# Patient Record
Sex: FEMALE | Race: BLACK OR AFRICAN AMERICAN | NOT HISPANIC OR LATINO | Employment: FULL TIME | URBAN - METROPOLITAN AREA
[De-identification: names, ages, dates, MRNs, and addresses within clinical notes are randomized per-mention and may not be internally consistent; named-entity substitution may affect disease eponyms.]

---

## 2021-05-14 ENCOUNTER — APPOINTMENT (OUTPATIENT)
Dept: GENERAL RADIOLOGY | Facility: CLINIC | Age: 31
End: 2021-05-14
Attending: EMERGENCY MEDICINE
Payer: COMMERCIAL

## 2021-05-14 ENCOUNTER — HOSPITAL ENCOUNTER (EMERGENCY)
Facility: CLINIC | Age: 31
Discharge: HOME | End: 2021-05-14
Attending: EMERGENCY MEDICINE | Admitting: EMERGENCY MEDICINE
Payer: COMMERCIAL

## 2021-05-14 VITALS
TEMPERATURE: 97.4 F | RESPIRATION RATE: 16 BRPM | OXYGEN SATURATION: 100 % | SYSTOLIC BLOOD PRESSURE: 124 MMHG | DIASTOLIC BLOOD PRESSURE: 82 MMHG | HEIGHT: 63 IN | WEIGHT: 145 LBS | BODY MASS INDEX: 25.69 KG/M2 | HEART RATE: 79 BPM

## 2021-05-14 DIAGNOSIS — R07.89 CHEST WALL PAIN: ICD-10-CM

## 2021-05-14 LAB
ANION GAP SERPL CALCULATED.3IONS-SCNC: 7 MMOL/L (ref 3–14)
BASOPHILS # BLD AUTO: 0 10E9/L (ref 0–0.2)
BASOPHILS NFR BLD AUTO: 0 %
BUN SERPL-MCNC: 8 MG/DL (ref 7–30)
CALCIUM SERPL-MCNC: 9.1 MG/DL (ref 8.5–10.1)
CHLORIDE SERPL-SCNC: 106 MMOL/L (ref 94–109)
CO2 SERPL-SCNC: 24 MMOL/L (ref 20–32)
CREAT SERPL-MCNC: 0.6 MG/DL (ref 0.52–1.04)
DIFFERENTIAL METHOD BLD: ABNORMAL
EOSINOPHIL # BLD AUTO: 0 10E9/L (ref 0–0.7)
EOSINOPHIL NFR BLD AUTO: 0 %
ERYTHROCYTE [DISTWIDTH] IN BLOOD BY AUTOMATED COUNT: 13.2 % (ref 10–15)
GFR SERPL CREATININE-BSD FRML MDRD: >90 ML/MIN/{1.73_M2}
GLUCOSE SERPL-MCNC: 78 MG/DL (ref 70–99)
HCT VFR BLD AUTO: 43.7 % (ref 35–47)
HGB BLD-MCNC: 14.3 G/DL (ref 11.7–15.7)
LYMPHOCYTES # BLD AUTO: 3 10E9/L (ref 0.8–5.3)
LYMPHOCYTES NFR BLD AUTO: 26 %
MAGNESIUM SERPL-MCNC: 2.1 MG/DL (ref 1.6–2.3)
MCH RBC QN AUTO: 31.2 PG (ref 26.5–33)
MCHC RBC AUTO-ENTMCNC: 32.7 G/DL (ref 31.5–36.5)
MCV RBC AUTO: 95 FL (ref 78–100)
MONOCYTES # BLD AUTO: 0.9 10E9/L (ref 0–1.3)
MONOCYTES NFR BLD AUTO: 8 %
NEUTROPHILS # BLD AUTO: 7.7 10E9/L (ref 1.6–8.3)
NEUTROPHILS NFR BLD AUTO: 66 %
PLATELET # BLD AUTO: 185 10E9/L (ref 150–450)
PLATELET # BLD EST: ABNORMAL 10*3/UL
POTASSIUM SERPL-SCNC: 3.5 MMOL/L (ref 3.4–5.3)
RBC # BLD AUTO: 4.59 10E12/L (ref 3.8–5.2)
RBC MORPH BLD: ABNORMAL
SODIUM SERPL-SCNC: 137 MMOL/L (ref 133–144)
WBC # BLD AUTO: 11.7 10E9/L (ref 4–11)

## 2021-05-14 PROCEDURE — 80048 BASIC METABOLIC PNL TOTAL CA: CPT | Performed by: EMERGENCY MEDICINE

## 2021-05-14 PROCEDURE — 83735 ASSAY OF MAGNESIUM: CPT | Performed by: EMERGENCY MEDICINE

## 2021-05-14 PROCEDURE — 71046 X-RAY EXAM CHEST 2 VIEWS: CPT

## 2021-05-14 PROCEDURE — 258N000003 HC RX IP 258 OP 636: Performed by: EMERGENCY MEDICINE

## 2021-05-14 PROCEDURE — 99284 EMERGENCY DEPT VISIT MOD MDM: CPT | Mod: 25

## 2021-05-14 PROCEDURE — 96374 THER/PROPH/DIAG INJ IV PUSH: CPT

## 2021-05-14 PROCEDURE — 85025 COMPLETE CBC W/AUTO DIFF WBC: CPT | Performed by: EMERGENCY MEDICINE

## 2021-05-14 PROCEDURE — 96361 HYDRATE IV INFUSION ADD-ON: CPT

## 2021-05-14 PROCEDURE — 250N000011 HC RX IP 250 OP 636: Performed by: EMERGENCY MEDICINE

## 2021-05-14 RX ORDER — IBUPROFEN 600 MG/1
600 TABLET, FILM COATED ORAL EVERY 6 HOURS
Qty: 28 TABLET | Refills: 0 | Status: SHIPPED | OUTPATIENT
Start: 2021-05-14 | End: 2021-05-21

## 2021-05-14 RX ORDER — SODIUM CHLORIDE 9 MG/ML
INJECTION, SOLUTION INTRAVENOUS CONTINUOUS
Status: DISCONTINUED | OUTPATIENT
Start: 2021-05-14 | End: 2021-05-14 | Stop reason: HOSPADM

## 2021-05-14 RX ORDER — PANTOPRAZOLE SODIUM 40 MG/1
40 TABLET, DELAYED RELEASE ORAL DAILY
Qty: 14 TABLET | Refills: 0 | Status: SHIPPED | OUTPATIENT
Start: 2021-05-14 | End: 2021-05-28

## 2021-05-14 RX ORDER — METHOCARBAMOL 750 MG/1
750-1500 TABLET, FILM COATED ORAL 3 TIMES DAILY PRN
Qty: 30 TABLET | Refills: 0 | Status: SHIPPED | OUTPATIENT
Start: 2021-05-14 | End: 2021-05-19

## 2021-05-14 RX ORDER — KETOROLAC TROMETHAMINE 15 MG/ML
15 INJECTION, SOLUTION INTRAMUSCULAR; INTRAVENOUS ONCE
Status: COMPLETED | OUTPATIENT
Start: 2021-05-14 | End: 2021-05-14

## 2021-05-14 RX ADMIN — SODIUM CHLORIDE 1000 ML: 9 INJECTION, SOLUTION INTRAVENOUS at 14:45

## 2021-05-14 RX ADMIN — KETOROLAC TROMETHAMINE 15 MG: 15 INJECTION, SOLUTION INTRAMUSCULAR; INTRAVENOUS at 14:45

## 2021-05-14 ASSESSMENT — ENCOUNTER SYMPTOMS
NUMBNESS: 1
DYSURIA: 0
MYALGIAS: 1
NAUSEA: 0
VOMITING: 0
DIFFICULTY URINATING: 0
FREQUENCY: 0
HEADACHES: 0

## 2021-05-14 ASSESSMENT — MIFFLIN-ST. JEOR: SCORE: 1341.85

## 2021-05-14 NOTE — LETTER
May 14, 2021      To Whom It May Concern:      Kristy Giang was seen in our Emergency Department today, 05/14/21.  I expect her condition to improve over the next 3 days.  She may return to work when improved.    Sincerely,        Lukasz Peguero MD

## 2021-05-14 NOTE — ED TRIAGE NOTES
Patient states she woke up yesterday with severe left sided body aches from shoulder down to left hip.  States she can't move that side of body without severe pain.      Denies sleeping wrong on that side.  Denies lifting injury.  Has not tried OTC meds.  Thinks it might be related to fasting.      ABCs intact.  Alert and oriented x 4.

## 2021-05-14 NOTE — DISCHARGE INSTRUCTIONS
1. -Take acetaminophen 500 to 1000 mg by mouth every 4 to 6 hours as needed for pain or fever.  Do not take more than 4000 mg in 24 hours.  Do not take within 6 hours of another acetaminophen containing medication such as norco (vicodin) or percocet.  - Take ibuprofen 600 to 800 mg by mouth every 6 to 8 hours as needed for pain or fever  2.  Please follow-up with your primary physician next week for recheck as scheduled.  3.  Please return to the emergency department as needed for new or worsening symptoms including severe and uncontrollable pain, worsening focal weakness, severe shortness of breath, fainting, any other concerning symptoms.

## 2021-05-14 NOTE — ED PROVIDER NOTES
"  History   Chief Complaint:  Generalized Body Aches     HPI   rKisty Giang is a right-handed 30 year old female who presents with generalized body aches. The patient reports that she \"cant feel\" the left side of her body and has been experiencing some chest pain. Both of these symptoms started about three hours ago. She endorses pain with inhalation and says that she is experiencing some numbness and soreness on the left side of her body. These symptoms are not present on the right. Chlorophyll and caffeine were used to treat symptoms at home, with little resolve. It was challenging for the patient to stand while making breakfast. Chest pain is rated at an 8/10 on the pain scale. A similar episode has never been experienced. She has recently been fasting for Ramadan. The patient denies changes in bowel movements and urination, falls, weakness, headache, history of surgeries, rash, nausea, or vomiting.     Of note, the patient has not been vaccinated for COVID and has never tested positive for the virus.     Review of Systems   Cardiovascular: Positive for chest pain.   Gastrointestinal: Negative for nausea and vomiting.   Genitourinary: Negative for decreased urine volume, difficulty urinating, dysuria, frequency and urgency.   Musculoskeletal: Positive for myalgias.   Skin: Negative for rash.   Neurological: Positive for numbness. Negative for headaches.   All other systems reviewed and are negative.      Allergies:  Hydrocodone    Medications:  Proair  Claritin    Past Medical History:    Allergic state    Past Surgical History:    No known surgical history    Family History:    No known family history    Social History:  Patient drove to the ED  Unaccompanied in ED  Denies tobacco, alcohol, or illicit drug use    Physical Exam     Patient Vitals for the past 24 hrs:   BP Temp Temp src Pulse Resp SpO2 Height Weight   05/14/21 1401 124/82 97.4  F (36.3  C) Temporal 79 14 100 % 1.6 m (5' 3\") 65.8 kg (145 lb) "       Physical Exam  Constitutional: Well developed, nontox appearance  Head: Atraumatic.   Neck:  no stridor, full ROM, no meningismus, no overt C-spine tenderness, negative Spurling's test  Eyes: no scleral icterus  Cardiovascular: RRR, 2+ bilat radial pulses  Pulmonary/Chest: nml resp effort, Clear BS bilat, reproducible left-sided chest pain  Abdominal: ND, soft, NT, no rebound or guarding   Ext: Warm, well perfused, no edema  Neurological: A&O, symmetric facies, moves ext x4, 5/5 strength throughout BU/LE, sensation grossly intact throughout  Skin: Skin is warm and dry.   Psychiatric: Behavior is normal. Thought content normal.   Nursing note and vitals reviewed.    Emergency Department Course     Imaging:    XR Chest 2 Views  No acute cardiopulmonary disease.    Read per radiology    Laboratory:     CBC: WBC 11.7 (H), HGB 14.3,    BMP: AWNL (Creatinine 0.60)     Magnesium: 2.1    Emergency Department Course:    Reviewed:  I reviewed nursing notes, vitals, past medical history and care everywhere    Assessments:  1426 I obtained history and examined the patient as noted above.   1625 I rechecked the patient and explained findings.     Interventions:  1445 Normal Saline 1000 mL IV  1445 Toradol 15 mg IV    Disposition:  The patient was discharged to home.     Impression & Plan     Medical Decision Makin year old female presenting w/ left-sided body pain, reproducible left chest pain    DDx includes chest wall pain, musculoskeletal pain, muscle strain, muscle spasm, electrolyte abnormality.  Doubt spinal cord pathology, CNS mass or CVA, meningitis, encephalitis, rhabdomyolysis given history and physical exam.  Labs significant for mild leukocytosis otherwise unremarkable.  Imaging sig for no acute cardiopulmonary disease.  Interventions as noted above with mild improvement in symptoms.  Given reproducibility on exam, doubt ACS, PE, pneumothorax or pleurisy.  Likely musculoskeletal in nature.   Prescriptions written as noted below for symptomatic relief with plan for outpatient follow-up with PCP as previously scheduled in 5 days.  At this time I feel the pt is safe for discharge.  Recommendations given regarding follow up with PCP and return to the emergency department as needed for new or worsening symptoms.  Pt counseled on all results, disposition and diagnosis.  They are understanding and agreeable to plan. Patient discharged in stable condition.      Diagnosis:    ICD-10-CM    1. Chest wall pain  R07.89        Discharge Medications:  New Prescriptions    IBUPROFEN (ADVIL/MOTRIN) 600 MG TABLET    Take 1 tablet (600 mg) by mouth every 6 hours for 7 days    METHOCARBAMOL (ROBAXIN) 750 MG TABLET    Take 1-2 tablets (750-1,500 mg) by mouth 3 times daily as needed for muscle spasms    PANTOPRAZOLE (PROTONIX) 40 MG EC TABLET    Take 1 tablet (40 mg) by mouth daily for 14 days       Scribe Disclosure:  Bartolome SANTOYO, am serving as a scribe at 2:29 PM on 5/14/2021 to document services personally performed by Lukasz Peguero MD based on my observations and the provider's statements to me.            Lukasz Peguero MD  05/14/21 1778

## 2021-05-16 ENCOUNTER — NURSE TRIAGE (OUTPATIENT)
Dept: NURSING | Facility: CLINIC | Age: 31
End: 2021-05-16

## 2021-05-16 PROCEDURE — 93005 ELECTROCARDIOGRAM TRACING: CPT

## 2021-05-16 PROCEDURE — 99284 EMERGENCY DEPT VISIT MOD MDM: CPT

## 2021-05-17 ENCOUNTER — HOSPITAL ENCOUNTER (EMERGENCY)
Facility: CLINIC | Age: 31
Discharge: HOME | End: 2021-05-17
Attending: EMERGENCY MEDICINE | Admitting: EMERGENCY MEDICINE
Payer: COMMERCIAL

## 2021-05-17 VITALS
RESPIRATION RATE: 16 BRPM | SYSTOLIC BLOOD PRESSURE: 110 MMHG | TEMPERATURE: 98 F | OXYGEN SATURATION: 99 % | DIASTOLIC BLOOD PRESSURE: 68 MMHG | HEART RATE: 62 BPM

## 2021-05-17 DIAGNOSIS — M79.10 MYALGIA: ICD-10-CM

## 2021-05-17 DIAGNOSIS — R07.89 NON-CARDIAC CHEST PAIN: ICD-10-CM

## 2021-05-17 LAB
D DIMER PPP FEU-MCNC: <0.3 UG/ML FEU (ref 0–0.5)
HCG SERPL QL: NEGATIVE
INTERPRETATION ECG - MUSE: NORMAL
TROPONIN I SERPL-MCNC: <0.015 UG/L (ref 0–0.04)

## 2021-05-17 PROCEDURE — 84484 ASSAY OF TROPONIN QUANT: CPT | Performed by: EMERGENCY MEDICINE

## 2021-05-17 PROCEDURE — 85379 FIBRIN DEGRADATION QUANT: CPT | Performed by: EMERGENCY MEDICINE

## 2021-05-17 PROCEDURE — 84703 CHORIONIC GONADOTROPIN ASSAY: CPT | Performed by: EMERGENCY MEDICINE

## 2021-05-17 ASSESSMENT — ENCOUNTER SYMPTOMS
COUGH: 0
FEVER: 0
ABDOMINAL PAIN: 0
SHORTNESS OF BREATH: 1

## 2021-05-17 NOTE — TELEPHONE ENCOUNTER
Data:   Pt calling to report they developed chest pain on 5/14/21 and was evaluated in the ED which determined it to be musculoskeletal in nature and discharged with muscle relaxer, ibuprofen and Protonix. Pt calls tonight to indicate worsening chest pain along with some shortness of breath with activity. Pt rates pain 8/10 and needed to remove their bra in order to relieve the pain. Pt also indicates has had a very stressful day which may be contributing. Pt indicates is afraid of getting addicted to muscle relaxer so has only taken one today this morning around 11 which did provide relief.        Action:   Per protocol pt to be seen at the ED now for severe pain. Pt will go to [facility] ER and has someone to drive them. Care advice given.      Response:   Pt will go to ED now. Pt verbalizes understanding of care advice given.        Keaton Bradford RN 5/16/2021 8:55 PM  [facility] Nurse Advisor       Reason for Disposition    SEVERE chest pain    Additional Information    Negative: Severe difficulty breathing (e.g., struggling for each breath, speaks in single words)    Negative: Difficult to awaken or acting confused (e.g., disoriented, slurred speech)    Negative: Shock suspected (e.g., cold/pale/clammy skin, too weak to stand, low BP, rapid pulse)    Negative: [1] Chest pain lasts > 5 minutes AND [2] history of heart disease  (i.e., heart attack, bypass surgery, angina, angioplasty, CHF; not just a heart murmur)    Negative: [1] Chest pain lasts > 5 minutes AND [2] described as crushing, pressure-like, or heavy    Negative: [1] Chest pain lasts > 5 minutes AND [2] age > 50    Negative: [1] Chest pain lasts > 5 minutes AND [2] age > 30 AND [3] at least one cardiac risk factor (i.e., hypertension, diabetes, obesity, smoker or strong family history of heart disease)    Negative: [1] Chest pain lasts > 5 minutes AND [2] not relieved with nitroglycerin    Negative: Passed out (i.e., lost consciousness, collapsed  and was not responding)    Negative: Heart beating < 50 beats per minute OR > 140 beats per minute    Negative: Visible sweat on face or sweat dripping down face    Negative: Sounds like a life-threatening emergency to the triager    Negative: Followed a chest injury    Protocols used: CHEST PAIN-A-AH    COVID 19 Nurse Triage Plan/Patient Instructions    Please be aware that novel coronavirus (COVID-19) may be circulating in the community. If you develop symptoms such as fever, cough, or SOB or if you have concerns about the presence of another infection including coronavirus (COVID-19), please contact your health care provider or visit [email or web address].     Disposition/Instructions    ED Visit recommended. Follow protocol based instructions.     Bring Your Own Device:  Please also bring your smart device(s) (smart phones, tablets, laptops) and their charging cables for your personal use and to communicate with your care team during your visit.    Thank you for taking steps to prevent the spread of this virus.  o Limit your contact with others.  o Wear a simple mask to cover your cough.  o Wash your hands well and often.    Resources    [facility]: About COVID-19: [email or web address]    CDC: What to Do If You're Sick: www.cdc.gov/coronavirus/2019-ncov/about/steps-when-sick.html    CDC: Ending Home Isolation: www.cdc.gov/coronavirus/2019-ncov/hcp/disposition-in-home-patients.html     CDC: Caring for Someone: www.cdc.gov/coronavirus/2019-ncov/if-you-are-sick/care-for-someone.html     [facility]: Interim Guidance for Hospital Discharge to Home: www.health.[facility].[place]/diseases/coronavirus/hcp/hospdischarge.pdf    [facility] clinical trials (COVID-19 research studies): [email or web address]/um-clinical-trials     Below are the COVID-19 hotlines at the [place] [facility] ([facility]). Interpreters are available.   o For health questions: Call 384-173-9721 or 1-294.165.1590 (7  a.m. to 7 p.m.)  o For questions about schools and childcare: Call 766-548-5010 or 1-877.938.8444 (7 a.m. to 7 p.m.)

## 2021-05-17 NOTE — ED TRIAGE NOTES
Patient reports being seen on 5/14 and dx costochondritis , prescribed muscle relaxer and reports minimal relief.    Last dose this AM

## 2021-05-17 NOTE — ED PROVIDER NOTES
History     Chief Complaint:  Chest Pain    HPI  Kristy Giang is a 30 year old female who presents to the emergency department for evaluation of chest pain. Patient was seen here in the ED on 5/14/21 for chest wall pain, workup at that time described below, and she was sent home with Robaxin and Protonix. Patient has been taking this but states this has not been helping. Her pain was initially only on the left side but is now across her chest, causing her to feel short of breath. Pain also worsens with breathing and movement. No fevers, cough, rash, or abdominal pain.    5/14 Workup:    XR Chest 2 Views  No acute cardiopulmonary disease.     CBC: WBC 11.7 (H), HGB 14.3,    BMP: AWNL (Creatinine 0.60)   Magnesium: 2.1    Review of Systems   Constitutional: Negative for fever.   Respiratory: Positive for shortness of breath. Negative for cough.    Cardiovascular: Positive for chest pain.   Gastrointestinal: Negative for abdominal pain.   Skin: Negative for rash.   All other systems reviewed and are negative.    Allergies:  Hydrocodone    Medications:    Albuterol  Loratadine  Robaxin  Protonix    Past Medical History:    No past medical history.    Social History:  The patient presents to the emergency department alone.    Physical Exam     Patient Vitals for the past 24 hrs:   BP Temp Pulse Resp SpO2   05/17/21 0128 110/68 -- 62 16 99 %   05/17/21 0115 110/68 -- 62 10 100 %   05/17/21 0100 108/71 -- 77 10 100 %   05/17/21 0045 114/74 -- 74 19 100 %   05/17/21 0030 122/83 -- 63 -- 99 %   05/16/21 2220 124/79 98  F (36.7  C) 68 18 100 %     Physical Exam  Nursing note and vitals reviewed.  Constitutional: Cooperative. Sitting up cross-legged on the bed. Appears comfortable.  HENT:   Mouth/Throat: Mucous membranes are normal.   Cardiovascular: Normal rate, regular rhythm and normal heart sounds.  No murmur.  Pulmonary/Chest: Effort normal and breath sounds normal. No respiratory distress. No wheezes. No  rales.   Abdominal: Soft. Normal appearance and bowel sounds are normal. No distension. There is no tenderness.   Musculoskeletal: No LE edema  Neurological: Alert. Oriented x4  Skin: Skin is warm and dry. No rash noted.   Psychiatric: Normal mood and affect.      Emergency Department Course   ECG  ECG taken at 0040, ECG read at 0047  Normal sinus rhythm with sinus arrhythmia. Normal ECG.  Rate 69 bpm. MT interval 158 ms. QRS duration 72 ms. QT/QTc 392/420 ms. P-R-T axes 80 67 41.     Laboratory:  Troponin (Collected 0036): <0.015    D-dimer: <0.3    HCG Qualitative Blood: Negative    Reviewed:  I reviewed the patient's nursing notes, vitals, past medical records, Care Everywhere.     Assessments:  0017 I assessed the patient. Exam findings described above.    0039 Patient declined COVID swab as she had a negative test earlier this week for her job.     115 I reassessed and updated the patient.     Disposition:  Discharged to home.    Impression & Plan    Medical Decision Making:  Kristy Giang is a 30 year old female who presents with chest pain.  The work up in the Emergency Department is negative.  I considered a broad differential diagnosis in this patient including life-threatening etiologies such as acute coronary syndrome, myocardial infarction, pulmonary embolism, acute aortic dissection, myocarditis, pericarditis, acute valvular insufficiency amongst others.  Other causes considered for this patient included pneumonia, pneumothorax, chest wall source, pericarditis, pleurisy, esophageal spasm, etc.  No serious etiology for the chest pain were detected today during this visit.  Close follow up with primary care is indicated should the pain continue, as further work up may be performed; this was made clear to the patient, who understands.     Diagnosis:    ICD-10-CM    1. Non-cardiac chest pain  R07.89    2. Myalgias  M79.10      Angelo Carrillo  5/17/2021   EMERGENCY DEPARTMENT  Scribe Disclosure:  Angelo SANTOYO  Gerardo, am serving as a scribe at 12:17 AM on 5/17/2021 to document services personally performed by Antonio Zuniga MD based on my observations and the provider's statements to me.          Antonio Zuniga MD  05/17/21 0207

## 2021-08-13 ENCOUNTER — APPOINTMENT (OUTPATIENT)
Dept: GENERAL RADIOLOGY | Facility: CLINIC | Age: 31
End: 2021-08-13
Attending: PHYSICIAN ASSISTANT
Payer: COMMERCIAL

## 2021-08-13 ENCOUNTER — HOSPITAL ENCOUNTER (EMERGENCY)
Facility: CLINIC | Age: 31
Discharge: HOME | End: 2021-08-13
Attending: PHYSICIAN ASSISTANT | Admitting: PHYSICIAN ASSISTANT
Payer: COMMERCIAL

## 2021-08-13 VITALS
HEART RATE: 83 BPM | SYSTOLIC BLOOD PRESSURE: 115 MMHG | TEMPERATURE: 98 F | RESPIRATION RATE: 16 BRPM | DIASTOLIC BLOOD PRESSURE: 81 MMHG | OXYGEN SATURATION: 98 %

## 2021-08-13 DIAGNOSIS — T50.Z95A ADVERSE EFFECT OF VACCINE, INITIAL ENCOUNTER: ICD-10-CM

## 2021-08-13 DIAGNOSIS — M79.10 MYALGIA: ICD-10-CM

## 2021-08-13 LAB
ATRIAL RATE - MUSE: 61 BPM
DIASTOLIC BLOOD PRESSURE - MUSE: NORMAL MMHG
INTERPRETATION ECG - MUSE: NORMAL
P AXIS - MUSE: 67 DEGREES
PR INTERVAL - MUSE: 154 MS
QRS DURATION - MUSE: 72 MS
QT - MUSE: 386 MS
QTC - MUSE: 388 MS
R AXIS - MUSE: 61 DEGREES
SYSTOLIC BLOOD PRESSURE - MUSE: NORMAL MMHG
T AXIS - MUSE: 32 DEGREES
VENTRICULAR RATE- MUSE: 61 BPM

## 2021-08-13 PROCEDURE — 71046 X-RAY EXAM CHEST 2 VIEWS: CPT

## 2021-08-13 PROCEDURE — 93005 ELECTROCARDIOGRAM TRACING: CPT

## 2021-08-13 PROCEDURE — 99285 EMERGENCY DEPT VISIT HI MDM: CPT

## 2021-08-13 RX ORDER — LORAZEPAM 2 MG/ML
INJECTION INTRAMUSCULAR
Status: DISCONTINUED
Start: 2021-08-13 | End: 2021-08-13 | Stop reason: HOSPADM

## 2021-08-13 ASSESSMENT — ENCOUNTER SYMPTOMS
DYSURIA: 0
COLOR CHANGE: 0
DIAPHORESIS: 0
NAUSEA: 0
SHORTNESS OF BREATH: 1
FEVER: 0
DIARRHEA: 0
MYALGIAS: 1
SORE THROAT: 0

## 2021-08-13 NOTE — ED TRIAGE NOTES
Patient presents with complaints of left arm pain. Got COVID vaccine 8/2 in left arm. Experienced typical arm pain for a couple days, but the pain started radiating underneath armpit this past week. Also experiencing some shortness of breath. No apparent respiratory distress in triage.

## 2021-08-13 NOTE — ED PROVIDER NOTES
History   Chief Complaint:  Arm Pain       HPI   Kristy Giang is a right handed 30 year old female who presents with left underarm pain and shortness of breath after getting her Covid-19 vaccine on August 2nd. The patient had soreness on the injection site for 5 days and the pain radiated to her underarm and her left ribs. She then developed shortness of breath. Yesterday the pain and shortness of breath got worse and she was not able tolerate the pain. She is not able to do normal activity with her left arm and cannot sleep on her left side. She denies diaphoresis, rash, fever, congestion, sore throat, nausea, diarrhea, dysuria, or discoloration. The patient does not take birth control and there is no chance of being pregnant. She normally coughs but there has been no changes.     Review of Systems   Constitutional: Negative for diaphoresis and fever.   HENT: Negative for congestion and sore throat.    Respiratory: Positive for shortness of breath.    Gastrointestinal: Negative for diarrhea and nausea.   Genitourinary: Negative for dysuria.   Musculoskeletal: Positive for myalgias.   Skin: Negative for color change and rash.     Allergies:  Hydrocodone    Medications:  The patient is currently on no regular medications.    Past Medical History:    The patient denies past medical history.     Family History:    Thyroid disease  Diabetes    Social History:  The patient presents alone.     Physical Exam     Patient Vitals for the past 24 hrs:   BP Temp Temp src Pulse Resp SpO2   08/13/21 1119 115/81 98  F (36.7  C) Oral 83 16 98 %   08/13/21 1117 -- 98  F (36.7  C) Oral 79 16 98 %       Physical Exam  Vitals and nursing note reviewed.   Constitutional:       General: She is not in acute distress.     Appearance: She is not diaphoretic.   HENT:      Head: Normocephalic and atraumatic.   Eyes:      General: No scleral icterus.  Cardiovascular:      Rate and Rhythm: Normal rate and regular rhythm.      Pulses:  Normal pulses.      Heart sounds: Normal heart sounds.   Pulmonary:      Effort: Pulmonary effort is normal. No respiratory distress.      Breath sounds: Normal breath sounds.   Chest:      Chest wall: Tenderness present.   Abdominal:      General: There is no distension.      Palpations: Abdomen is soft.      Tenderness: There is no abdominal tenderness. There is no guarding.   Musculoskeletal:         General: No tenderness.      Comments: No erythema, petechiae, obvious swelling, deformity, contusoin, pallor. There is tenderness of the L deltoid and pain along the L flank. No rash noted.    Skin:     General: Skin is warm.      Findings: No rash.   Neurological:      Mental Status: She is alert.       Emergency Department Course   ECG  ECG taken at 1148, ECG read at 1152  Normal sinus rhythm with sinus arrhythmia   No significant changes as compared to prior, dated 07/17/2021.  Rate 61 bpm. MA interval 154 ms. QRS duration 72 ms. QT/QTc 386/388 ms. P-R-T axes 67 61 32.     Imaging:  Chest XR, PA and LAT:  Since May 14, 2021, heart size is normal. No pleural   effusion, pneumothorax, or abnormal area of consolidation.    Reading per radiology.    Emergency Department Course:    Reviewed:  I reviewed nursing notes, vitals, past medical history and care everywhere    Assessments:  1127 I obtained history and examined the patient as noted above.     1233 I rechecked the patient and explained findings.     Disposition:  The patient was discharged to home.       Impression & Plan     Medical Decision Making:  No findings of cellulitis, septic arthritis, or abscess formation. No neurovascular deficits ruling down thoracic outlet syndrome, arterial occlusion. Clinically unlikely to be DVT formation. There is tenderness to the injection site and likely local immune reaction. No findings of tension pneumothorax on examination. EKG without changes of ischemia, dysrhythmia. Low suspicion for ACS/CAD at this time. Azeem harper  risk, PERC negative and pulmonary embolism appears unlikely. CXR without pneumothorax, pulmonary edema, pneumonia, rib fracture, free air in abdomen. Denies symptoms of UTI, kidney stone. She voices pregnancy is impossible ruling down ectopic pregnancy or pregnancy. Low suspicion for substantial rhabdomyolysis.      Diagnosis:    ICD-10-CM    1. Adverse effect of vaccine, initial encounter  T50.Z95A    2. Myalgia  M79.10        Discharge Medications:  New Prescriptions    DICLOFENAC (VOLTAREN) 1 % TOPICAL GEL    Apply 2 g topically 4 times daily for 7 days       Scribe Disclosure:  Tariq SANTOYO, am serving as a scribe at 11:31 AM on 8/13/2021 to document services personally performed by Fuentes Mancera based on my observations and the provider's statements to me.            Fuentes Mancera PA-C  08/13/21 [id number]

## 2024-02-08 ENCOUNTER — HOSPITAL ENCOUNTER (EMERGENCY)
Facility: CLINIC | Age: 34
Discharge: HOME | End: 2024-02-09
Attending: EMERGENCY MEDICINE | Admitting: EMERGENCY MEDICINE

## 2024-02-08 DIAGNOSIS — R79.89 ELEVATED D-DIMER: ICD-10-CM

## 2024-02-08 DIAGNOSIS — R07.89 OTHER CHEST PAIN: ICD-10-CM

## 2024-02-08 PROCEDURE — 96361 HYDRATE IV INFUSION ADD-ON: CPT

## 2024-02-08 PROCEDURE — 258N000003 HC RX IP 258 OP 636: Performed by: EMERGENCY MEDICINE

## 2024-02-08 PROCEDURE — 99285 EMERGENCY DEPT VISIT HI MDM: CPT | Mod: 25

## 2024-02-08 RX ORDER — LIDOCAINE 40 MG/G
CREAM TOPICAL
Status: DISCONTINUED | OUTPATIENT
Start: 2024-02-08 | End: 2024-02-09 | Stop reason: HOSPADM

## 2024-02-08 RX ADMIN — SODIUM CHLORIDE 1000 ML: 9 INJECTION, SOLUTION INTRAVENOUS at 23:33

## 2024-02-08 ASSESSMENT — ACTIVITIES OF DAILY LIVING (ADL): ADLS_ACUITY_SCORE: 35

## 2024-02-08 NOTE — Clinical Note
Kristy Giang was seen and treated in our emergency department on 2/8/2024.  She may return to work on 02/12/2024.       If you have any questions or concerns, please don't hesitate to call.      Lukasz Gustafson MD

## 2024-02-09 ENCOUNTER — APPOINTMENT (OUTPATIENT)
Dept: CT IMAGING | Facility: CLINIC | Age: 34
End: 2024-02-09
Attending: EMERGENCY MEDICINE

## 2024-02-09 VITALS
HEIGHT: 63 IN | HEART RATE: 86 BPM | BODY MASS INDEX: 26.95 KG/M2 | SYSTOLIC BLOOD PRESSURE: 93 MMHG | WEIGHT: 152.12 LBS | TEMPERATURE: 98.9 F | RESPIRATION RATE: 18 BRPM | OXYGEN SATURATION: 100 % | DIASTOLIC BLOOD PRESSURE: 67 MMHG

## 2024-02-09 LAB
ALBUMIN UR-MCNC: NEGATIVE MG/DL
ANION GAP SERPL CALCULATED.3IONS-SCNC: 11 MMOL/L (ref 7–15)
APPEARANCE UR: CLEAR
BASOPHILS # BLD AUTO: 0 10E3/UL (ref 0–0.2)
BASOPHILS NFR BLD AUTO: 0 %
BILIRUB UR QL STRIP: NEGATIVE
BUN SERPL-MCNC: 10.4 MG/DL (ref 6–20)
CALCIUM SERPL-MCNC: 8.3 MG/DL (ref 8.6–10)
CHLORIDE SERPL-SCNC: 101 MMOL/L (ref 98–107)
COLOR UR AUTO: ABNORMAL
CREAT SERPL-MCNC: 0.73 MG/DL (ref 0.51–0.95)
DEPRECATED HCO3 PLAS-SCNC: 24 MMOL/L (ref 22–29)
EGFRCR SERPLBLD CKD-EPI 2021: >90 ML/MIN/1.73M2
EOSINOPHIL # BLD AUTO: 0.1 10E3/UL (ref 0–0.7)
EOSINOPHIL NFR BLD AUTO: 1 %
ERYTHROCYTE [DISTWIDTH] IN BLOOD BY AUTOMATED COUNT: 13.6 % (ref 10–15)
GLUCOSE SERPL-MCNC: 96 MG/DL (ref 70–99)
GLUCOSE UR STRIP-MCNC: NEGATIVE MG/DL
HCG SERPL QL: NEGATIVE
HCT VFR BLD AUTO: 38.7 % (ref 35–47)
HGB BLD-MCNC: 12.7 G/DL (ref 11.7–15.7)
HGB UR QL STRIP: NEGATIVE
IMM GRANULOCYTES # BLD: 0.1 10E3/UL
IMM GRANULOCYTES NFR BLD: 0 %
INR PPP: 1.06 (ref 0.85–1.15)
KETONES UR STRIP-MCNC: NEGATIVE MG/DL
LACTATE SERPL-SCNC: 0.9 MMOL/L (ref 0.7–2)
LEUKOCYTE ESTERASE UR QL STRIP: NEGATIVE
LIPASE SERPL-CCNC: 26 U/L (ref 13–60)
LYMPHOCYTES # BLD AUTO: 2.8 10E3/UL (ref 0.8–5.3)
LYMPHOCYTES NFR BLD AUTO: 21 %
MCH RBC QN AUTO: 29.6 PG (ref 26.5–33)
MCHC RBC AUTO-ENTMCNC: 32.8 G/DL (ref 31.5–36.5)
MCV RBC AUTO: 90 FL (ref 78–100)
MONOCYTES # BLD AUTO: 1 10E3/UL (ref 0–1.3)
MONOCYTES NFR BLD AUTO: 8 %
MUCOUS THREADS #/AREA URNS LPF: PRESENT /LPF
NEUTROPHILS # BLD AUTO: 9.3 10E3/UL (ref 1.6–8.3)
NEUTROPHILS NFR BLD AUTO: 70 %
NITRATE UR QL: NEGATIVE
NRBC # BLD AUTO: 0 10E3/UL
NRBC BLD AUTO-RTO: 0 /100
PH UR STRIP: 5 [PH] (ref 5–7)
PLATELET # BLD AUTO: 232 10E3/UL (ref 150–450)
POTASSIUM SERPL-SCNC: 3.7 MMOL/L (ref 3.4–5.3)
RBC # BLD AUTO: 4.29 10E6/UL (ref 3.8–5.2)
RBC URINE: 0 /HPF
SODIUM SERPL-SCNC: 136 MMOL/L (ref 135–145)
SP GR UR STRIP: 1.01 (ref 1–1.03)
SQUAMOUS EPITHELIAL: <1 /HPF
TROPONIN T SERPL HS-MCNC: <6 NG/L
UROBILINOGEN UR STRIP-MCNC: NORMAL MG/DL
WBC # BLD AUTO: 13.4 10E3/UL (ref 4–11)
WBC URINE: 0 /HPF

## 2024-02-09 PROCEDURE — 96360 HYDRATION IV INFUSION INIT: CPT | Mod: 59

## 2024-02-09 PROCEDURE — 84484 ASSAY OF TROPONIN QUANT: CPT | Performed by: EMERGENCY MEDICINE

## 2024-02-09 PROCEDURE — 83605 ASSAY OF LACTIC ACID: CPT | Performed by: EMERGENCY MEDICINE

## 2024-02-09 PROCEDURE — 36415 COLL VENOUS BLD VENIPUNCTURE: CPT | Performed by: EMERGENCY MEDICINE

## 2024-02-09 PROCEDURE — 71275 CT ANGIOGRAPHY CHEST: CPT

## 2024-02-09 PROCEDURE — 250N000011 HC RX IP 250 OP 636: Performed by: EMERGENCY MEDICINE

## 2024-02-09 PROCEDURE — 87040 BLOOD CULTURE FOR BACTERIA: CPT | Performed by: EMERGENCY MEDICINE

## 2024-02-09 PROCEDURE — 84703 CHORIONIC GONADOTROPIN ASSAY: CPT | Performed by: EMERGENCY MEDICINE

## 2024-02-09 PROCEDURE — 250N000009 HC RX 250: Performed by: EMERGENCY MEDICINE

## 2024-02-09 PROCEDURE — 85610 PROTHROMBIN TIME: CPT | Performed by: EMERGENCY MEDICINE

## 2024-02-09 PROCEDURE — 81001 URINALYSIS AUTO W/SCOPE: CPT | Performed by: EMERGENCY MEDICINE

## 2024-02-09 PROCEDURE — 83690 ASSAY OF LIPASE: CPT | Performed by: EMERGENCY MEDICINE

## 2024-02-09 PROCEDURE — 85025 COMPLETE CBC W/AUTO DIFF WBC: CPT | Performed by: EMERGENCY MEDICINE

## 2024-02-09 PROCEDURE — 80048 BASIC METABOLIC PNL TOTAL CA: CPT | Performed by: EMERGENCY MEDICINE

## 2024-02-09 RX ORDER — IOPAMIDOL 755 MG/ML
500 INJECTION, SOLUTION INTRAVASCULAR ONCE
Status: COMPLETED | OUTPATIENT
Start: 2024-02-09 | End: 2024-02-09

## 2024-02-09 RX ADMIN — IOPAMIDOL 65 ML: 755 INJECTION, SOLUTION INTRAVENOUS at 01:37

## 2024-02-09 RX ADMIN — SODIUM CHLORIDE 77 ML: 9 INJECTION, SOLUTION INTRAVENOUS at 01:38

## 2024-02-09 ASSESSMENT — ACTIVITIES OF DAILY LIVING (ADL): ADLS_ACUITY_SCORE: 35

## 2024-02-09 NOTE — ED PROVIDER NOTES
History     Chief Complaint:  Shortness of Breath       HPI   Kristy Giang is a 33 year old female who presents with shortness of breath. 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 The patient had her blood pressure checked by the school nurse which was elevated at 148/something. She then went to [facility] to try and figure out why she was experiencing an elevated blood pressure and some shortness of breath. She had numerous tests that mostly came back normal. Her Ddimer came back elevated and she also did a test where she walked around the clinic. The patient has been feeling faint and fatigued. She notes feeling lightheaded and nauseous often. She has also been experiencing room spinning dizziness. The patient notes that working out and walking around a lot while at school makes her more shortness of breath and light headed. The patient has been experiencing some chest tightness for the past 6 days. It does not get worse when working out. She reports that her dizziness is at a 6 out of 10 while laying down. It worsens when she sits up or stands up. Her last period was 1/20/24. She does report experiencing some left sided otalgia. The patient denies recent travel, recent illness, cough, fever, runny nose, or chance or pregnancy.     Independent Historian:    None     Review of External Notes:  Note from office visit today reviewed    Medications:    Aldactone   Albuterol     Past Medical History:    No pertinent past medical history.    Physical Exam   Patient Vitals for the past 24 hrs:   BP Temp Temp src Pulse Resp SpO2 Height Weight   02/09/24 0110 100/60 -- -- 90 -- 100 % -- --   02/09/24 0100 102/70 -- -- 85 -- 100 % -- --   02/09/24 0050 97/61 -- -- 81 -- 100 % -- --   02/09/24 0030 103/66 -- -- 85 -- 100 % -- --   02/09/24 0020 104/69 -- -- 77 -- 100 % -- --   02/09/24 0000 105/73 -- -- 84 -- 100 % -- --   02/08/24 2350 104/64 -- -- 70 -- 100 % -- --   02/08/24  "2340 104/64 -- -- 65 -- 98 % -- --   02/08/24 2330 113/75 -- -- 81 -- 98 % -- --   02/08/24 2320 117/70 -- -- 97 -- -- -- --   02/08/24 2310 117/70 -- -- 84 -- 97 % -- --   02/08/24 2300 107/77 -- -- 77 -- 98 % -- --   02/08/24 2250 104/71 -- -- 82 -- 98 % -- --   02/08/24 2240 -- -- -- 82 -- 98 % -- --   02/08/24 2230 113/77 -- -- -- -- -- -- --   02/08/24 2109 (!) 134/91 98.9  F (37.2  C) Oral 90 18 99 % 1.6 m (5' 3\") 69 kg (152 lb 1.9 oz)      Physical Exam  General: The patient is alert, in no respiratory distress.    HENT: Mucous membranes moist.    Cardiovascular: Regular rate and rhythm. Good pulses in all four extremities. Normal capillary refill and skin turgor. Lightheaded with sitting upright. She has a portable cardiac monitor on her left chest.    Respiratory: Lungs are clear. No nasal flaring. No retractions. No wheezing, no crackles. Decreased breath sounds.    Gastrointestinal: Abdomen soft. No guarding, no rebound. No palpable hernias.     Musculoskeletal: No gross deformity. No chest wall tenderness.     Skin: No rashes or petechiae.     Neurologic: The patient is alert and oriented x3. GCS 15. No testable cranial nerve deficit. Follows commands with clear and appropriate speech. Gives appropriate answers. Good strength in all extremities. No gross neurologic deficit. Gross sensation intact. Pupils are round and reactive. No meningismus.     Lymphatic: No cervical adenopathy. No lower extremity swelling.    Psychiatric: The patient is non-tearful.     Emergency Department Course     Imaging:  CT Chest Pulmonary Embolism w Contrast   Final Result   IMPRESSION:   1.  Negative for pulmonary embolism.      2.  No evidence of pneumonia or pulmonary edema.        Report per radiology    Laboratory:  Labs Ordered and Resulted from Time of ED Arrival to Time of ED Departure   BASIC METABOLIC PANEL - Abnormal       Result Value    Sodium 136      Potassium 3.7      Chloride 101      Carbon Dioxide (CO2) 24  "     Anion Gap 11      Urea Nitrogen 10.4      Creatinine 0.73      GFR Estimate >90      Calcium 8.3 (*)     Glucose 96     ROUTINE UA WITH MICROSCOPIC - Abnormal    Color Urine Light Yellow      Appearance Urine Clear      Glucose Urine Negative      Bilirubin Urine Negative      Ketones Urine Negative      Specific Gravity Urine 1.011      Blood Urine Negative      pH Urine 5.0      Protein Albumin Urine Negative      Urobilinogen Urine Normal      Nitrite Urine Negative      Leukocyte Esterase Urine Negative      Mucus Urine Present (*)     RBC Urine 0      WBC Urine 0      Squamous Epithelials Urine <1     CBC WITH PLATELETS AND DIFFERENTIAL - Abnormal    WBC Count 13.4 (*)     RBC Count 4.29      Hemoglobin 12.7      Hematocrit 38.7      MCV 90      MCH 29.6      MCHC 32.8      RDW 13.6      Platelet Count 232      % Neutrophils 70      % Lymphocytes 21      % Monocytes 8      % Eosinophils 1      % Basophils 0      % Immature Granulocytes 0      NRBCs per 100 WBC 0      Absolute Neutrophils 9.3 (*)     Absolute Lymphocytes 2.8      Absolute Monocytes 1.0      Absolute Eosinophils 0.1      Absolute Basophils 0.0      Absolute Immature Granulocytes 0.1      Absolute NRBCs 0.0     INR - Normal    INR 1.06     LIPASE - Normal    Lipase 26     LACTIC ACID WHOLE BLOOD - Normal    Lactic Acid 0.9     TROPONIN T, HIGH SENSITIVITY - Normal    Troponin T, High Sensitivity <6     HCG QUALITATIVE PREGNANCY - Normal    hCG Serum Qualitative Negative     BLOOD CULTURE   BLOOD CULTURE        Procedures       Emergency Department Course & Assessments:       Interventions:  Medications   lidocaine 1 % 0.1-1 mL (has no administration in time range)   lidocaine (LMX4) cream (has no administration in time range)   sodium chloride (PF) 0.9% PF flush 3 mL ( Intracatheter Canceled Entry 2/9/24 0154)   sodium chloride (PF) 0.9% PF flush 3 mL (has no administration in time range)   sodium chloride 0.9% BOLUS 1,000 mL (0 mLs  Intravenous Stopped 2/9/24 0153)   CT scan flush (77 mLs Intravenous $Given 2/9/24 0138)   iopamidol (ISOVUE-370) solution 500 mL (65 mLs Intravenous $Given 2/9/24 0137)      Assessments:  2258 I obtained history and examined the patient as noted above.   2312 I obtained the remainder of the patient's history as noted above.  0220 I reassessed patient and discussed results  Independent Interpretation (X-rays, CTs, rhythm strip):  I reviewed the CT and do not see signs of pneumonia           Social Determinants of Health affecting care:  None      Disposition:  The patient was discharged to home.     Impression & Plan    CMS Diagnoses: None    Medical Decision Making:  The patient had gone to an urgent care and had a D-dimer and CBC checked.  The white count was elevated as was the D-dimer.  I did feel however that with her recent aggressive workouts the chest wall pain is more likely musculoskeletal but I did feel she required a CT scan to evaluate for PE.  Thankfully this was negative we had discussed the risks and benefits of the CT.  Her white count here is lower and there is no signs of pneumonia I reviewed that CT as well as the radiologist.  The patient has a negative troponin at this point I do not think this is ACS.  Pleurisy dissection or intra-abdominal causes were considered but these are not present on the CT the patient will take ibuprofen I stressed need for follow-up and she was discharged home in good condition.    Diagnosis:    ICD-10-CM    1. Other chest pain  R07.89       2. Elevated d-dimer  R79.89            Discharge Medications:  New Prescriptions    No medications on file      Scribe Disclosure:  I, Stanley Robles, am serving as a scribe at 10:39 PM on 2/8/2024 to document services personally performed by Lukasz Gustafson MD based on my observations and the provider's statements to me.               Lukasz Gustafson MD  02/09/24 [id number]

## 2024-02-09 NOTE — ED TRIAGE NOTES
Pt arrives with Sob and chest pain for 1 week, pt was seen at clinic today and was told to go to the ER for an elevated d-dimer. ABCs intact and Aox4.      Triage Assessment (Adult)       Row Name 02/08/24 2110          Triage Assessment    Airway WDL WDL        Respiratory WDL    Respiratory WDL WDL        Cardiac WDL    Cardiac WDL WDL

## 2024-02-14 LAB
BACTERIA BLD CULT: NO GROWTH
BACTERIA BLD CULT: NO GROWTH